# Patient Record
Sex: FEMALE | Race: WHITE | NOT HISPANIC OR LATINO | Employment: FULL TIME | URBAN - METROPOLITAN AREA
[De-identification: names, ages, dates, MRNs, and addresses within clinical notes are randomized per-mention and may not be internally consistent; named-entity substitution may affect disease eponyms.]

---

## 2023-09-08 ENCOUNTER — OFFICE VISIT (OUTPATIENT)
Dept: URGENT CARE | Facility: CLINIC | Age: 38
End: 2023-09-08

## 2023-09-08 ENCOUNTER — HOSPITAL ENCOUNTER (OUTPATIENT)
Facility: MEDICAL CENTER | Age: 38
End: 2023-09-08
Attending: NURSE PRACTITIONER
Payer: COMMERCIAL

## 2023-09-08 VITALS
SYSTOLIC BLOOD PRESSURE: 110 MMHG | HEIGHT: 66 IN | DIASTOLIC BLOOD PRESSURE: 86 MMHG | HEART RATE: 94 BPM | OXYGEN SATURATION: 96 % | TEMPERATURE: 98 F | WEIGHT: 154.32 LBS | RESPIRATION RATE: 16 BRPM | BODY MASS INDEX: 24.8 KG/M2

## 2023-09-08 DIAGNOSIS — Z02.1 PHYSICAL EXAM, PRE-EMPLOYMENT: ICD-10-CM

## 2023-09-08 DIAGNOSIS — Z02.1 PRE-EMPLOYMENT DRUG SCREENING: ICD-10-CM

## 2023-09-08 LAB
AMP AMPHETAMINE: NORMAL
COC COCAINE: NORMAL
INT CON NEG: NORMAL
INT CON POS: NORMAL
MET METHAMPHETAMINES: NORMAL
OPI OPIATES: NORMAL
PCP PHENCYCLIDINE: NORMAL
POC DRUG COMMENT 753798-OCCUPATIONAL HEALTH: NORMAL
THC: NORMAL

## 2023-09-08 PROCEDURE — 3079F DIAST BP 80-89 MM HG: CPT | Performed by: NURSE PRACTITIONER

## 2023-09-08 PROCEDURE — 86480 TB TEST CELL IMMUN MEASURE: CPT | Performed by: NURSE PRACTITIONER

## 2023-09-08 PROCEDURE — 3074F SYST BP LT 130 MM HG: CPT | Performed by: NURSE PRACTITIONER

## 2023-09-08 PROCEDURE — 80305 DRUG TEST PRSMV DIR OPT OBS: CPT | Performed by: NURSE PRACTITIONER

## 2023-09-08 PROCEDURE — 8915 PR COMPREHENSIVE PHYSICAL: Performed by: NURSE PRACTITIONER

## 2023-09-08 RX ORDER — CLONAZEPAM 2 MG/1
1 TABLET ORAL 2 TIMES DAILY
COMMUNITY

## 2023-09-08 RX ORDER — TRAZODONE HYDROCHLORIDE 300 MG/1
300 TABLET ORAL NIGHTLY
COMMUNITY
End: 2023-09-25

## 2023-09-08 RX ORDER — OXCARBAZEPINE 600 MG/1
600 TABLET, FILM COATED ORAL 2 TIMES DAILY
COMMUNITY
End: 2023-09-25

## 2023-09-08 NOTE — PROGRESS NOTES
"Subjective:     Iris Luna is a 37 y.o. female who presents for Employment Physical (Px, TBQ, UDS)       Patient presents for a pre-employment physical exam. Refer to paper documentation scanned into electronic health record under \"Media.\"    During this visit, appropriate PPE was worn and hand hygiene was performed.    PMH:  has no past medical history on file.    MEDS:   Current Outpatient Medications:     clonazepam (KLONOPIN) 2 MG tablet, Take 1 mg by mouth 2 times a day., Disp: , Rfl:     oxcarbazepine (TRILEPTAL) 600 MG tablet, Take 600 mg by mouth 2 times a day., Disp: , Rfl:     trazodone (DESYREL) 300 MG tablet, Take 300 mg by mouth every evening., Disp: , Rfl:     ALLERGIES: No Known Allergies  SURGHX: No past surgical history on file.  SOCHX:       FH: Reviewed with patient, not pertinent to this visit.    ROS reviewed. Refer to paper documentation scanned into electronic health record under \"Media.\"      Objective:     /86   Pulse 94   Temp 36.7 °C (98 °F) (Temporal)   Resp 16   Ht 1.676 m (5' 6\")   Wt 70 kg (154 lb 5.2 oz)   SpO2 96%   BMI 24.91 kg/m²     Physical exam WNL.    Refer to paper documentation scanned into electronic health record under \"Media.\"    Quantiferon pending.      Assessment/Plan:     1. Physical exam, pre-employment  - Quantiferon Gold Plus TB (4 tube); Future    2. Pre-employment drug screening  - POCT 6 Panel Urine Drug Screen    Cleared for duty without restriction or accomodation. Refer to paper documentation scanned into electronic health record under \"Media.\"    "

## 2023-09-10 LAB
GAMMA INTERFERON BACKGROUND BLD IA-ACNC: 0.05 IU/ML
M TB IFN-G BLD-IMP: NEGATIVE
M TB IFN-G CD4+ BCKGRND COR BLD-ACNC: 0.01 IU/ML
MITOGEN IGNF BCKGRD COR BLD-ACNC: >10 IU/ML
QFT TB2 - NIL TBQ2: 0.01 IU/ML

## 2023-09-25 ENCOUNTER — HOSPITAL ENCOUNTER (EMERGENCY)
Facility: MEDICAL CENTER | Age: 38
End: 2023-09-25
Attending: STUDENT IN AN ORGANIZED HEALTH CARE EDUCATION/TRAINING PROGRAM
Payer: COMMERCIAL

## 2023-09-25 ENCOUNTER — OFFICE VISIT (OUTPATIENT)
Dept: URGENT CARE | Facility: PHYSICIAN GROUP | Age: 38
End: 2023-09-25
Payer: COMMERCIAL

## 2023-09-25 ENCOUNTER — APPOINTMENT (OUTPATIENT)
Dept: RADIOLOGY | Facility: MEDICAL CENTER | Age: 38
End: 2023-09-25
Attending: STUDENT IN AN ORGANIZED HEALTH CARE EDUCATION/TRAINING PROGRAM
Payer: COMMERCIAL

## 2023-09-25 VITALS
HEIGHT: 66 IN | DIASTOLIC BLOOD PRESSURE: 92 MMHG | HEART RATE: 113 BPM | TEMPERATURE: 97.9 F | BODY MASS INDEX: 27.97 KG/M2 | SYSTOLIC BLOOD PRESSURE: 122 MMHG | WEIGHT: 174 LBS | RESPIRATION RATE: 14 BRPM | OXYGEN SATURATION: 95 %

## 2023-09-25 VITALS
SYSTOLIC BLOOD PRESSURE: 118 MMHG | HEART RATE: 68 BPM | OXYGEN SATURATION: 92 % | WEIGHT: 172.4 LBS | BODY MASS INDEX: 27.83 KG/M2 | DIASTOLIC BLOOD PRESSURE: 57 MMHG | RESPIRATION RATE: 16 BRPM | TEMPERATURE: 97.8 F

## 2023-09-25 DIAGNOSIS — L03.119 CELLULITIS OF LOWER EXTREMITY, UNSPECIFIED LATERALITY: Primary | ICD-10-CM

## 2023-09-25 DIAGNOSIS — R60.0 BILATERAL LOWER EXTREMITY EDEMA: ICD-10-CM

## 2023-09-25 DIAGNOSIS — R00.0 TACHYCARDIA: ICD-10-CM

## 2023-09-25 LAB
ALBUMIN SERPL BCP-MCNC: 4.1 G/DL (ref 3.2–4.9)
ALBUMIN/GLOB SERPL: 1.8 G/DL
ALP SERPL-CCNC: 84 U/L (ref 30–99)
ALT SERPL-CCNC: 11 U/L (ref 2–50)
ANION GAP SERPL CALC-SCNC: 11 MMOL/L (ref 7–16)
AST SERPL-CCNC: 13 U/L (ref 12–45)
BASOPHILS # BLD AUTO: 0.3 % (ref 0–1.8)
BASOPHILS # BLD: 0.03 K/UL (ref 0–0.12)
BILIRUB SERPL-MCNC: 0.5 MG/DL (ref 0.1–1.5)
BUN SERPL-MCNC: 16 MG/DL (ref 8–22)
CALCIUM ALBUM COR SERPL-MCNC: 8.8 MG/DL (ref 8.5–10.5)
CALCIUM SERPL-MCNC: 8.9 MG/DL (ref 8.5–10.5)
CHLORIDE SERPL-SCNC: 104 MMOL/L (ref 96–112)
CO2 SERPL-SCNC: 26 MMOL/L (ref 20–33)
CREAT SERPL-MCNC: 0.77 MG/DL (ref 0.5–1.4)
D DIMER PPP IA.FEU-MCNC: <0.27 UG/ML (FEU) (ref 0–0.5)
EOSINOPHIL # BLD AUTO: 0.08 K/UL (ref 0–0.51)
EOSINOPHIL NFR BLD: 0.8 % (ref 0–6.9)
ERYTHROCYTE [DISTWIDTH] IN BLOOD BY AUTOMATED COUNT: 54.8 FL (ref 35.9–50)
GFR SERPLBLD CREATININE-BSD FMLA CKD-EPI: 100 ML/MIN/1.73 M 2
GLOBULIN SER CALC-MCNC: 2.3 G/DL (ref 1.9–3.5)
GLUCOSE SERPL-MCNC: 91 MG/DL (ref 65–99)
HCG SERPL QL: NEGATIVE
HCT VFR BLD AUTO: 37.7 % (ref 37–47)
HGB BLD-MCNC: 12.3 G/DL (ref 12–16)
IMM GRANULOCYTES # BLD AUTO: 0.05 K/UL (ref 0–0.11)
IMM GRANULOCYTES NFR BLD AUTO: 0.5 % (ref 0–0.9)
LYMPHOCYTES # BLD AUTO: 2.34 K/UL (ref 1–4.8)
LYMPHOCYTES NFR BLD: 23.3 % (ref 22–41)
MCH RBC QN AUTO: 32.6 PG (ref 27–33)
MCHC RBC AUTO-ENTMCNC: 32.6 G/DL (ref 32.2–35.5)
MCV RBC AUTO: 100 FL (ref 81.4–97.8)
MONOCYTES # BLD AUTO: 0.85 K/UL (ref 0–0.85)
MONOCYTES NFR BLD AUTO: 8.5 % (ref 0–13.4)
NEUTROPHILS # BLD AUTO: 6.69 K/UL (ref 1.82–7.42)
NEUTROPHILS NFR BLD: 66.6 % (ref 44–72)
NRBC # BLD AUTO: 0 K/UL
NRBC BLD-RTO: 0 /100 WBC (ref 0–0.2)
NT-PROBNP SERPL IA-MCNC: 507 PG/ML (ref 0–125)
PLATELET # BLD AUTO: 323 K/UL (ref 164–446)
PMV BLD AUTO: 9.7 FL (ref 9–12.9)
POTASSIUM SERPL-SCNC: 3.4 MMOL/L (ref 3.6–5.5)
PROT SERPL-MCNC: 6.4 G/DL (ref 6–8.2)
RBC # BLD AUTO: 3.77 M/UL (ref 4.2–5.4)
SODIUM SERPL-SCNC: 141 MMOL/L (ref 135–145)
TROPONIN T SERPL-MCNC: <6 NG/L (ref 6–19)
TROPONIN T SERPL-MCNC: <6 NG/L (ref 6–19)
WBC # BLD AUTO: 10 K/UL (ref 4.8–10.8)

## 2023-09-25 PROCEDURE — A9270 NON-COVERED ITEM OR SERVICE: HCPCS | Performed by: STUDENT IN AN ORGANIZED HEALTH CARE EDUCATION/TRAINING PROGRAM

## 2023-09-25 PROCEDURE — 99285 EMERGENCY DEPT VISIT HI MDM: CPT

## 2023-09-25 PROCEDURE — 36415 COLL VENOUS BLD VENIPUNCTURE: CPT

## 2023-09-25 PROCEDURE — 3080F DIAST BP >= 90 MM HG: CPT | Performed by: PHYSICIAN ASSISTANT

## 2023-09-25 PROCEDURE — 83880 ASSAY OF NATRIURETIC PEPTIDE: CPT

## 2023-09-25 PROCEDURE — 93005 ELECTROCARDIOGRAM TRACING: CPT

## 2023-09-25 PROCEDURE — 85025 COMPLETE CBC W/AUTO DIFF WBC: CPT

## 2023-09-25 PROCEDURE — 71045 X-RAY EXAM CHEST 1 VIEW: CPT

## 2023-09-25 PROCEDURE — 700111 HCHG RX REV CODE 636 W/ 250 OVERRIDE (IP): Mod: JZ | Performed by: STUDENT IN AN ORGANIZED HEALTH CARE EDUCATION/TRAINING PROGRAM

## 2023-09-25 PROCEDURE — 84703 CHORIONIC GONADOTROPIN ASSAY: CPT

## 2023-09-25 PROCEDURE — 80053 COMPREHEN METABOLIC PANEL: CPT

## 2023-09-25 PROCEDURE — 85379 FIBRIN DEGRADATION QUANT: CPT

## 2023-09-25 PROCEDURE — 99215 OFFICE O/P EST HI 40 MIN: CPT | Performed by: PHYSICIAN ASSISTANT

## 2023-09-25 PROCEDURE — 3074F SYST BP LT 130 MM HG: CPT | Performed by: PHYSICIAN ASSISTANT

## 2023-09-25 PROCEDURE — 96374 THER/PROPH/DIAG INJ IV PUSH: CPT

## 2023-09-25 PROCEDURE — 700102 HCHG RX REV CODE 250 W/ 637 OVERRIDE(OP): Performed by: STUDENT IN AN ORGANIZED HEALTH CARE EDUCATION/TRAINING PROGRAM

## 2023-09-25 PROCEDURE — 93005 ELECTROCARDIOGRAM TRACING: CPT | Performed by: STUDENT IN AN ORGANIZED HEALTH CARE EDUCATION/TRAINING PROGRAM

## 2023-09-25 PROCEDURE — 84484 ASSAY OF TROPONIN QUANT: CPT | Mod: 91

## 2023-09-25 RX ORDER — IBUPROFEN 600 MG/1
TABLET ORAL
COMMUNITY

## 2023-09-25 RX ORDER — ACETAMINOPHEN 500 MG
1000 TABLET ORAL ONCE
Status: COMPLETED | OUTPATIENT
Start: 2023-09-25 | End: 2023-09-25

## 2023-09-25 RX ORDER — DOXYCYCLINE HYCLATE 100 MG/1
100 CAPSULE ORAL 2 TIMES DAILY
COMMUNITY
Start: 2023-09-14

## 2023-09-25 RX ORDER — OXCARBAZEPINE 600 MG/1
1 TABLET, FILM COATED ORAL 2 TIMES DAILY
COMMUNITY

## 2023-09-25 RX ORDER — CYCLOBENZAPRINE HCL 10 MG
1 TABLET ORAL 3 TIMES DAILY PRN
COMMUNITY

## 2023-09-25 RX ORDER — CLONAZEPAM 1 MG/1
2 TABLET ORAL
COMMUNITY
End: 2023-09-25

## 2023-09-25 RX ORDER — AMOXICILLIN AND CLAVULANATE POTASSIUM 875; 125 MG/1; MG/1
1 TABLET, FILM COATED ORAL 2 TIMES DAILY
COMMUNITY

## 2023-09-25 RX ORDER — NALTREXONE HYDROCHLORIDE 50 MG/1
1 TABLET, FILM COATED ORAL
COMMUNITY

## 2023-09-25 RX ORDER — VALACYCLOVIR HYDROCHLORIDE 1 G/1
TABLET, FILM COATED ORAL
COMMUNITY

## 2023-09-25 RX ORDER — CEFTRIAXONE 1 G/1
1000 INJECTION, POWDER, FOR SOLUTION INTRAMUSCULAR; INTRAVENOUS ONCE
Status: COMPLETED | OUTPATIENT
Start: 2023-09-25 | End: 2023-09-25

## 2023-09-25 RX ORDER — TRAZODONE HYDROCHLORIDE 150 MG/1
TABLET ORAL
COMMUNITY
Start: 2023-09-21

## 2023-09-25 RX ORDER — FLUCONAZOLE 150 MG/1
TABLET ORAL
COMMUNITY

## 2023-09-25 RX ORDER — TRAZODONE HYDROCHLORIDE 150 MG/1
2 TABLET ORAL
COMMUNITY

## 2023-09-25 RX ORDER — SUMATRIPTAN 100 MG/1
TABLET, FILM COATED ORAL
COMMUNITY

## 2023-09-25 RX ADMIN — CEFTRIAXONE SODIUM 1000 MG: 1 INJECTION, POWDER, FOR SOLUTION INTRAMUSCULAR; INTRAVENOUS at 22:08

## 2023-09-25 RX ADMIN — ACETAMINOPHEN 1000 MG: 500 TABLET, FILM COATED ORAL at 21:29

## 2023-09-25 ASSESSMENT — ENCOUNTER SYMPTOMS
COUGH: 1
VOMITING: 0
SPUTUM PRODUCTION: 1
SHORTNESS OF BREATH: 1
FEVER: 0
NAUSEA: 0

## 2023-09-25 NOTE — PROGRESS NOTES
Subjective:   Iris Luna is a 38 y.o. female who presents for Edema (Bilateral )        Patient presents with concerns of bilateral lower extremity edema that has been rapidly worsening over the last 3 days.  She states that her legs are achy and painful.  Prior to the onset of her symptoms she was diagnosed with bronchitis.  She has been on doxycycline for 7 days without significant symptomatic improvement.  She endorses productive cough as well as some exertional shortness of breath.  She denies PND and orthopnea.  Denies recent surgery, exogenous hormone use, history of VTE.  No fevers.  Denies similar symptoms in the past.      Review of Systems   Constitutional:  Negative for fever.   Respiratory:  Positive for cough, sputum production and shortness of breath.    Cardiovascular:  Positive for leg swelling.   Gastrointestinal:  Negative for nausea and vomiting.       PMH:  has no past medical history on file.  MEDS:   Current Outpatient Medications:     cyclobenzaprine (FLEXERIL) 10 mg Tab, Take 1 Tablet by mouth 3 times a day as needed., Disp: , Rfl:     diclofenac DR (VOLTAREN) 50 MG Tablet Delayed Response, TAKE 1 TABLET (50 MG TOTAL) BY MOUTH 2 TIMES A DAY AS NEEDED (BACK PAIN MODERATE TO SEVERE)., Disp: , Rfl:     doxycycline (VIBRAMYCIN) 100 MG Cap, Take 100 mg by mouth 2 times a day., Disp: , Rfl:     ibuprofen (MOTRIN) 600 MG Tab, TAKE 1 TABLET (600 MG TOTAL) BY MOUTH EVERY 6 HOURS AS NEEDED FOR PAIN (PAIN)., Disp: , Rfl:     naltrexone (DEPADE) 50 MG Tab, Take 1 Tablet by mouth every day., Disp: , Rfl:     sumatriptan (IMITREX) 100 MG tablet, TAKE 1 TAB AT ONSET OF HEADACHE. MAY REPEAT IN 2 HOURS. MAX: 2 DOSES IN 24 HOURS., Disp: , Rfl:     valacyclovir (VALTREX) 1 GM Tab, TAKE 1 TABLET BY MOUTH EVERY 12 HOURS AS DIRECTED FOR 7 DAYS, Disp: , Rfl:     oxcarbazepine (TRILEPTAL) 600 MG tablet, Take 1 Tablet by mouth 2 times a day., Disp: , Rfl:     traZODone (DESYREL) 150 MG Tab, Take 2 Tablets by  "mouth at bedtime., Disp: , Rfl:     clonazepam (KLONOPIN) 2 MG tablet, Take 1 mg by mouth 2 times a day., Disp: , Rfl:     amoxicillin-clavulanate (AUGMENTIN) 875-125 MG Tab, Take 1 Tablet by mouth 2 times a day. (Patient not taking: Reported on 9/25/2023), Disp: , Rfl:     fluconazole (DIFLUCAN) 150 MG tablet, TAKE 1 TABLET BY MOUTH EVERY 72 HOURS AS DIRECTED FOR 6 DAYS (Patient not taking: Reported on 9/25/2023), Disp: , Rfl:     traZODone (DESYREL) 150 MG Tab, , Disp: , Rfl:   ALLERGIES:   Allergies   Allergen Reactions    Azithromycin Nausea     Other reaction(s): Abdominal Pain, Not available     SURGHX: No past surgical history on file.  SOCHX:    FH: Family history was reviewed, no pertinent findings to report   Objective:   BP (!) 122/92   Pulse (!) 113   Temp 36.6 °C (97.9 °F) (Temporal)   Resp 14   Ht 1.676 m (5' 6\")   Wt 78.9 kg (174 lb)   SpO2 95%   BMI 28.08 kg/m²   Physical Exam  Vitals reviewed.   Constitutional:       General: She is not in acute distress.     Appearance: Normal appearance. She is well-developed. She is not toxic-appearing.   HENT:      Head: Normocephalic and atraumatic.      Right Ear: External ear normal.      Left Ear: External ear normal.      Nose: Nose normal.   Cardiovascular:      Rate and Rhythm: Regular rhythm. Tachycardia present.      Heart sounds: Normal heart sounds.      Comments: Significant bilateral lower extremity edema, 3+ pitting, bilaterally.  Left leg greater in circumference than right.  Dorsalis pedis and posterior tibial pulses 1+ bilaterally.  Pulmonary:      Effort: Pulmonary effort is normal. No respiratory distress.      Breath sounds: No stridor.      Comments: Rhonchi and rales lower lung fields bilaterally.  Skin:     General: Skin is dry.   Neurological:      Comments: Alert and oriented.    Psychiatric:         Speech: Speech normal.         Behavior: Behavior normal.           Assessment/Plan:   1. Bilateral lower extremity edema    2. " Tachycardia    Other orders  - amoxicillin-clavulanate (AUGMENTIN) 875-125 MG Tab; Take 1 Tablet by mouth 2 times a day. (Patient not taking: Reported on 9/25/2023)  - cyclobenzaprine (FLEXERIL) 10 mg Tab; Take 1 Tablet by mouth 3 times a day as needed.  - diclofenac DR (VOLTAREN) 50 MG Tablet Delayed Response; TAKE 1 TABLET (50 MG TOTAL) BY MOUTH 2 TIMES A DAY AS NEEDED (BACK PAIN MODERATE TO SEVERE).  - doxycycline (VIBRAMYCIN) 100 MG Cap; Take 100 mg by mouth 2 times a day.  - fluconazole (DIFLUCAN) 150 MG tablet; TAKE 1 TABLET BY MOUTH EVERY 72 HOURS AS DIRECTED FOR 6 DAYS (Patient not taking: Reported on 9/25/2023)  - ibuprofen (MOTRIN) 600 MG Tab; TAKE 1 TABLET (600 MG TOTAL) BY MOUTH EVERY 6 HOURS AS NEEDED FOR PAIN (PAIN).  - naltrexone (DEPADE) 50 MG Tab; Take 1 Tablet by mouth every day.  - sumatriptan (IMITREX) 100 MG tablet; TAKE 1 TAB AT ONSET OF HEADACHE. MAY REPEAT IN 2 HOURS. MAX: 2 DOSES IN 24 HOURS.  - valacyclovir (VALTREX) 1 GM Tab; TAKE 1 TABLET BY MOUTH EVERY 12 HOURS AS DIRECTED FOR 7 DAYS  - oxcarbazepine (TRILEPTAL) 600 MG tablet; Take 1 Tablet by mouth 2 times a day.  - traZODone (DESYREL) 150 MG Tab; Take 2 Tablets by mouth at bedtime.  - traZODone (DESYREL) 150 MG Tab;  (Patient not taking: Reported on 9/25/2023)    Differential broad at this point, but presentation concerning.  Recommend immediate reevaluation at a higher level of care.  Patient will go to [facility] ED for reevaluation and further management.

## 2023-09-25 NOTE — LETTER
[facility]  [facility] [address] [address]     September 25, 2023    Patient: Iris Luna   YOB: 1985   Date of Visit: 9/25/2023       To Whom It May Concern:    Iris Luna was seen and treated in our department on 9/25/2023. I recommend that she be reevaluated in the Emergency Department immediately. She will go to [facility] ED for reevaluation.    Sincerely,     Titi Naik P.A.-C.

## 2023-09-26 LAB — EKG IMPRESSION: NORMAL

## 2023-09-26 NOTE — DISCHARGE INSTRUCTIONS
Continue taking the antibiotics as prescribed.  You can try compression stockings while at work, elevating your legs at night.  Make an appoint with a primary care doctor, you may require additional work-up as an outpatient such as ultrasounds of your heart, legs, and follow-up to ensure your symptoms improve.

## 2023-09-26 NOTE — ED PROVIDER NOTES
"ED Provider Note    CHIEF COMPLAINT  Chief Complaint   Patient presents with    Peripheral Edema       EXTERNAL RECORDS REVIEWED  Outpatient Notes the urgent care saw her today, sent her to the emergency department for evaluation of leg swelling    HPI/ROS  LIMITATION TO HISTORY   Select: : None      Iris Luna is a 38 y.o. female who presents with atraumatic leg swelling for several days, she has been recovering from \"bronchitis \"and has had a cough, has been on a course of doxycycline.  He denies a history of leg swelling, she does note the left is more swollen than the right and they are both painful.  She denies a family history of DVT/PE, recent plane flights traumatic injuries surgeries not on OCPs no hemoptysis, does report dyspnea and occasional chest tightness without any chest pain.  Denies a history or heart issues she is a smoker, she drinks 2-3 drinks per night, for the past several weeks.  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    PAST MEDICAL HISTORY       SURGICAL HISTORY  patient denies any surgical history    FAMILY HISTORY  History reviewed. No pertinent family history.    SOCIAL HISTORY  Social History     Tobacco Use    Smoking status: Every Day     Types: Cigarettes    Smokeless tobacco: Not on file   Substance and Sexual Activity    Alcohol use: Yes    Drug use: Not Currently    Sexual activity: Not on file       CURRENT MEDICATIONS  Home Medications       Reviewed by Lina Peterson R.N. (Registered Nurse) on 09/25/23 at 1835  Med List Status: Partial     Medication Last Dose Status   amoxicillin-clavulanate (AUGMENTIN) 875-125 MG Tab  Active   clonazepam (KLONOPIN) 2 MG tablet  Active   cyclobenzaprine (FLEXERIL) 10 mg Tab  Active   diclofenac DR (VOLTAREN) 50 MG Tablet Delayed Response  Active   doxycycline (VIBRAMYCIN) 100 MG Cap  Active   fluconazole (DIFLUCAN) 150 MG tablet  Active   ibuprofen (MOTRIN) 600 MG Tab  Active   naltrexone (DEPADE) 50 MG Tab  Active "   oxcarbazepine (TRILEPTAL) 600 MG tablet  Active   sumatriptan (IMITREX) 100 MG tablet  Active   traZODone (DESYREL) 150 MG Tab  Active   traZODone (DESYREL) 150 MG Tab  Active   valacyclovir (VALTREX) 1 GM Tab  Active                    ALLERGIES  Allergies   Allergen Reactions    Azithromycin Nausea     Other reaction(s): Abdominal Pain, Not available       PHYSICAL EXAM  VITAL SIGNS: /57   Pulse 68   Temp 36.6 °C (97.8 °F) (Temporal)   Resp 16   Wt 78.2 kg (172 lb 6.4 oz)   SpO2 92%   BMI 27.83 kg/m²    General: Pleasant female occasionally coughing, no respiratory distress  Head: Normocephalic atraumatic  Eyes: Extraocular motion intact  Neck: Supple, no rigidity  Cardiovascular: Regular rate and rhythm no murmurs rubs or gallops  Respiratory: Clear to auscultation bilaterally, equal chest rise and fall, no increased work of breathing  Abdomen: Soft nontender no guarding  Musculoskeletal: Warm and well perfused, 3+ peripheral edema.  There is induration and warmth to the anterior shins bilaterally, with a pruritic appearing rash.  2+ DP pulses bilaterally.    Neuro: Alert, no focal deficits  Integumentary: No wounds or rashes      DIAGNOSTIC STUDIES / PROCEDURES  EKG  I have independently interpreted this EKG  Results for orders placed or performed during the hospital encounter of 23   EKG   Result Value Ref Range    Report       [facility] Emergency [facility].    Test Date:  2023  Pt Name:    HENRRY AUGUSTIN             Department: ER  MRN:        [id number]                      Room:  Gender:     Female                       Technician: [id number]  :        1985                   Requested By:ER TRIAGE PROTOCOL  Order #:    [id number]                    Polly MD: Tacos Sands    Measurements  Intervals                                Axis  Rate:       82                           P:          69  GA:         117                          QRS:        54  QRSD:       103                           T:          16  QT:         389  QTc:        455    Interpretive Statements  Normal sinus rhythm, rate of 82, normal axis, no ST elevations or  depressions.  No priors available for comparison.  Electronically Signed On 09- 01:53:46 PDT by Tacos REYES  D-dimer undetectable, troponins negative and undetectable, mild hypokalemia, no anemia, no renal dysfunction, no hepatic dysfunction,      RADIOLOGY  I have independently interpreted the diagnostic imaging associated with this visit and am waiting the final reading from the radiologist.   My preliminary interpretation is as follows: No pulmonary edema  Radiologist interpretation:   DX-CHEST-PORTABLE (1 VIEW)   Final Result      No acute cardiopulmonary abnormality.            COURSE & MEDICAL DECISION MAKING    ED Observation Status? Yes; I am placing the patient in to an observation status due to a diagnostic uncertainty as well as therapeutic intensity. Patient placed in observation status at 8:45 PM, 9/25/2023.     Observation plan is as follows: Labs, D-dimer, serial troponins, EKG, chest x-ray    Upon Reevaluation, the patient's condition has: Improved; and will be discharged.    Patient discharged from ED Observation status at 10:30 PM 9/25/2023    INITIAL ASSESSMENT, COURSE AND PLAN  Care Narrative: 38-year-old female presenting with cough for weeks, 3 days of painful leg swelling bilaterally, no traumatic injuries, no history of DVT, no significant risk factors.  Differential included was not limited to: Heart failure, renal failure, liver failure, DVT/PE, cellulitis, venous stasis        DISPOSITION AND DISCUSSIONS  Patient is stable for discharge home, her work-up is reassuring without evidence of life-threatening causality causing her leg swelling, suspect it may be cellulitis she is already on a course of antibiotics which may be appropriate.  Dependent edema is also suspected, versus chronic venous stasis,  she was referred to PCP, we discussed symptomatic treatment at home, return precautions were given, patient verbalized understanding agreement plan of care was discharged in no acute distress        Escalation of care considered, and ultimately not performed:diagnostic imagingCT pulmonary arteries, ultrasound of the legs    Barriers to care at this time, including but not limited to: Patient does not have established PCP.     Decision tools and prescription drugs considered including, but not limited to:  Wells criteria, low risk .    FINAL DIAGNOSIS  1. Cellulitis of lower extremity, unspecified laterality    2. Bilateral lower extremity edema           Electronically signed by: Tacos aSnds M.D., 9/25/2023 8:14 PM

## 2023-09-28 ENCOUNTER — TELEPHONE (OUTPATIENT)
Dept: HEALTH INFORMATION MANAGEMENT | Facility: OTHER | Age: 38
End: 2023-09-28
Payer: COMMERCIAL

## 2024-01-02 ENCOUNTER — APPOINTMENT (OUTPATIENT)
Dept: URGENT CARE | Facility: PHYSICIAN GROUP | Age: 39
End: 2024-01-02
Payer: OTHER MISCELLANEOUS

## 2024-05-30 ENCOUNTER — OFFICE VISIT (OUTPATIENT)
Dept: BEHAVIORAL HEALTH | Facility: CLINIC | Age: 39
End: 2024-05-30
Payer: COMMERCIAL

## 2024-05-30 VITALS
DIASTOLIC BLOOD PRESSURE: 68 MMHG | OXYGEN SATURATION: 99 % | HEIGHT: 66 IN | BODY MASS INDEX: 27.64 KG/M2 | SYSTOLIC BLOOD PRESSURE: 122 MMHG | HEART RATE: 83 BPM | WEIGHT: 172 LBS

## 2024-05-30 DIAGNOSIS — F41.0 GENERALIZED ANXIETY DISORDER WITH PANIC ATTACKS: ICD-10-CM

## 2024-05-30 DIAGNOSIS — F33.1 MODERATE EPISODE OF RECURRENT MAJOR DEPRESSIVE DISORDER (HCC): ICD-10-CM

## 2024-05-30 DIAGNOSIS — F41.1 GENERALIZED ANXIETY DISORDER WITH PANIC ATTACKS: ICD-10-CM

## 2024-05-30 DIAGNOSIS — F43.10 PTSD (POST-TRAUMATIC STRESS DISORDER): ICD-10-CM

## 2024-05-30 RX ORDER — GABAPENTIN 600 MG/1
600 TABLET ORAL 3 TIMES DAILY
Qty: 90 TABLET | Refills: 0 | Status: SHIPPED | OUTPATIENT
Start: 2024-05-30

## 2024-05-30 RX ORDER — DULOXETIN HYDROCHLORIDE 30 MG/1
30 CAPSULE, DELAYED RELEASE ORAL NIGHTLY
Qty: 30 CAPSULE | Refills: 0 | Status: SHIPPED | OUTPATIENT
Start: 2024-05-30

## 2024-05-30 ASSESSMENT — FIBROSIS 4 INDEX: FIB4 SCORE: 0.49
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
## 2024-06-13 ENCOUNTER — OFFICE VISIT (OUTPATIENT)
Dept: BEHAVIORAL HEALTH | Facility: CLINIC | Age: 39
End: 2024-06-13
Payer: COMMERCIAL

## 2024-06-13 VITALS
HEART RATE: 85 BPM | HEIGHT: 66 IN | SYSTOLIC BLOOD PRESSURE: 124 MMHG | BODY MASS INDEX: 27.64 KG/M2 | OXYGEN SATURATION: 96 % | WEIGHT: 172 LBS | DIASTOLIC BLOOD PRESSURE: 70 MMHG

## 2024-06-13 DIAGNOSIS — F41.0 GENERALIZED ANXIETY DISORDER WITH PANIC ATTACKS: ICD-10-CM

## 2024-06-13 DIAGNOSIS — F43.10 PTSD (POST-TRAUMATIC STRESS DISORDER): ICD-10-CM

## 2024-06-13 DIAGNOSIS — F33.1 MODERATE EPISODE OF RECURRENT MAJOR DEPRESSIVE DISORDER (HCC): ICD-10-CM

## 2024-06-13 DIAGNOSIS — F41.1 GENERALIZED ANXIETY DISORDER WITH PANIC ATTACKS: ICD-10-CM

## 2024-06-13 PROCEDURE — 3074F SYST BP LT 130 MM HG: CPT | Performed by: PSYCHIATRY & NEUROLOGY

## 2024-06-13 PROCEDURE — 3078F DIAST BP <80 MM HG: CPT | Performed by: PSYCHIATRY & NEUROLOGY

## 2024-06-13 PROCEDURE — 99214 OFFICE O/P EST MOD 30 MIN: CPT | Performed by: PSYCHIATRY & NEUROLOGY

## 2024-06-13 PROCEDURE — 90836 PSYTX W PT W E/M 45 MIN: CPT | Performed by: PSYCHIATRY & NEUROLOGY

## 2024-06-13 RX ORDER — CLONAZEPAM 2 MG/1
2 TABLET ORAL 3 TIMES DAILY
Qty: 90 TABLET | Refills: 1 | Status: SHIPPED | OUTPATIENT
Start: 2024-06-13 | End: 2024-08-12

## 2024-06-13 RX ORDER — TRAZODONE HYDROCHLORIDE 150 MG/1
300 TABLET ORAL NIGHTLY
Qty: 30 TABLET | Refills: 3 | Status: SHIPPED | OUTPATIENT
Start: 2024-06-13

## 2024-06-13 RX ORDER — CLONAZEPAM 2 MG/1
2 TABLET ORAL 3 TIMES DAILY
Qty: 90 TABLET | Refills: 1 | Status: SHIPPED | OUTPATIENT
Start: 2024-06-13 | End: 2024-06-13

## 2024-06-13 RX ORDER — TRAZODONE HYDROCHLORIDE 150 MG/1
300 TABLET ORAL NIGHTLY
Qty: 30 TABLET | Refills: 3 | Status: SHIPPED | OUTPATIENT
Start: 2024-06-13 | End: 2024-06-13

## 2024-06-13 RX ORDER — GABAPENTIN 600 MG/1
1200 TABLET ORAL 3 TIMES DAILY
Qty: 90 TABLET | Refills: 0 | Status: SHIPPED | OUTPATIENT
Start: 2024-06-13 | End: 2024-06-13

## 2024-06-13 RX ORDER — OXCARBAZEPINE 300 MG/1
300 TABLET, FILM COATED ORAL NIGHTLY
Qty: 30 TABLET | Refills: 3 | Status: SHIPPED | OUTPATIENT
Start: 2024-06-13

## 2024-06-13 RX ORDER — DULOXETIN HYDROCHLORIDE 60 MG/1
60 CAPSULE, DELAYED RELEASE ORAL DAILY
Qty: 30 CAPSULE | Refills: 1 | Status: SHIPPED | OUTPATIENT
Start: 2024-06-13

## 2024-06-13 RX ORDER — GABAPENTIN 600 MG/1
1200 TABLET ORAL 3 TIMES DAILY
Qty: 90 TABLET | Refills: 0 | Status: SHIPPED | OUTPATIENT
Start: 2024-06-13

## 2024-06-13 RX ORDER — OXCARBAZEPINE 300 MG/1
300 TABLET, FILM COATED ORAL NIGHTLY
Qty: 30 TABLET | Refills: 3 | Status: SHIPPED | OUTPATIENT
Start: 2024-06-13 | End: 2024-06-13

## 2024-06-13 ASSESSMENT — ENCOUNTER SYMPTOMS
NECK PAIN: 1
BACK PAIN: 1
COUGH: 1
NAUSEA: 1
SHORTNESS OF BREATH: 1
VOMITING: 1
SPUTUM PRODUCTION: 1
HEARTBURN: 1
HEMOPTYSIS: 0

## 2024-06-13 ASSESSMENT — FIBROSIS 4 INDEX: FIB4 SCORE: 0.49
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
## 2024-06-13 NOTE — PROGRESS NOTES
"[facility]  PSYCHIATRY FOLLOW-UP NOTE    Identifying Data:  Iris Luna is a  38 y.o. white woman with past medical history of endometriosis, chronic neck and back pain, and past psychiatric history of trauma, ADHD and bipolar disorder, unspecified, referred by her PCP, who presented for intake on 5/30/2024 for depression, anxiety, & bereavement., upon referral by her PCP.  She is currently diagnosed with CHEL, PTSD, ADHD, with an unspecified bipolar disorder (per hx), continued on Klonopin 2 mg PO TID, Trileptal 300 mg PO qhs, & Trazodone 150 mg x2 (300 mg), & at intake was started onto Cymbalta 30 mg PO qhs & Gabapentin 600 mg PO TID.    History Of Present Illness: Feels that she has been doing a little bit better and has been experiencing some \"good days\" which she has not experienced in a long time.  However she is finding that she is waking up with anxiety and waking up at 5:00 in the morning without fail every day.  Goes to bed around 1030 or 11 PM but when she tries to sleep and on the weekend and most days she is unable to sleep last 5 AM.  She ran out of her Trileptal 7 days ago and has been taking Cymbalta at night.  Feels as though she is not getting good quality sleep and is still waking up a few times during the night.  When waking up in the morning she will find herself coughing, then starts feeling sick and sometimes vomits, and gets shaky and feels a heaviness in her chest.  When waking up in the middle of the night does not experience anxiety in this manner.  Has been having more dreams lately, some of them were bothersome, however she also had a dream about her mother and found comfort with this.  2 weeks ago came across a butterfly keychain which felt like a message from her mother because she loved butterflies.  Also saw a male and female bird interact in a way that left her feeling the presence of her parents was with her.    There have been a lot of stressors including " "her father's upcoming memorial service, starting a new job, ongoing stressors with the neighbors and continued accusations by her  that she is being unfaithful, and her 's court case.  Her father's memorial at the [facility] is on the 27th, but since she cannot afford flying back home, she will be able to attend online.  There has been more marital strain, and today expressed \"I do not know if I even have a  when I go home because of how he has been acting.\"  Yesterday took 2 gabapentin at once because she was feeling very stressed about attending a hearing, but found that it was able to help calm her down.  Has noticed that some of her pain has improved and not feeling pinching pain in her shoulders as much and is not experiencing the tingling in her hands that she had been.  Feels it has been helping with about 10 to 20% of her pain but knows that she needs to set an appointment up with her primary care provider to get some imaging done.  Has been able to reduce her kratom usage significantly and her cannabis usage since starting gabapentin.  Denied any side effects from gabapentin, although it does make her eyes feel tired she does not feel that it makes her sleepy or sedated.  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  Had to come in on her break from work in order to make today's appointment.  Still struggling with mood and anxiety, low energy, reduced appetite having not eaten anything at all yesterday and had nothing yet to eat as of 1:30 PM.      Psychiatric Review of Systems:  current symptoms as reported by pt.  Depression: Depressed mood, Difficulty sleeping, Anhedonia, Sense of hopelessness, Low energy, Low appetite, and Difficulty concentrating  Hypomania/Lucretia: Patient has had the following symptoms for over a week:, " "Distractibility, Racing thoughts, and Patient denies any change in mood, increased energy, or marked irritability  Anxiety/Panic Attacks: Denies any anxiety associated symptoms, chest pain, shortness of breath, insomnia, racing thoughts, psychomotor agitation, feelings of losing control, difficulty concentrating  Trauma:  have dreams about her mother who recently passed  Psychosis: Patient reports no signs or symptoms indicative of psychosis  ADHD: fails to give close attention to details or makes careless mistakes in school, has difficulty sustaining attention in tasks or play activities, does not seem to listen when spoken to directly, has difficulty organizing tasks and activities, is easily distracted by extraneous stimuli, is often forgetful in daily activities, fidgets with hands or feet or squirms in seat    Medical Review of Systems:    Review of Systems   Respiratory:  Positive for cough, sputum production and shortness of breath. Negative for hemoptysis.    Gastrointestinal:  Positive for heartburn, nausea and vomiting.   Musculoskeletal:  Positive for back pain and neck pain.       Past Psychiatric History:  Past O/P therapy: Individual therapy - 10 years ago x1 year when she went on probation.    Past O/P psychiatry treatment: [facility] (couple years) when she was living in [place], was able to get her medication recently from a primary care provider here in [place] until she was able to be seen by psychiatrist.  Has been on psychiatric medications on and off over the years since she was a child.  Prior diagnoses: Bipolar disorder, major depressive disorder  Medication trials:   -Trileptal 300 mg p.o. twice daily (however always forgets to take the morning dose and only takes bedtime dose): Uncertain of the benefit  -Klonopin 2 mg PO TID (3 years): helps with anxiety (only med that she's truly found helpful)  -Ritalin: \"it made everything quiet\" and didn't like it around 4-5th grade.  "   -Depakote:  may have been given to her in the past, does not remember  -Lithium: ineffective   -Zoloft: may have been given as a child uncertain of benefit/side effects  -Gabapentin 600 mg: helped with pain   -Xanax 0.5 mg TID: ineffective   -Lexapro: may have been given as a child, uncertain of benefit/side effects  Past psychiatric hospitalizations: 3 years ago - hospitalized 1-2 weeks transferred from an incarceration due to not having access in the halfway to her medications.  When she was 26 yo she reports being hospitalized due to severe anxiety.   Previous suicide attempts:  Denied  History of self-harm: Cut herself once about 3 months ago due to wanting to not feel her psychological pain.    Substance Use History:  Caffeine: 12 oz coffee infrequently, every morning.    Tobacco/Nicotine: Smokes a little under 1 ppd.    ETOH: Has been buying 3 nips  (shooters - vodka) to relax.   Cannabis/CBD: Previously was smoking 1 joint daily.  However since starting gabapentin has been able to stop using due to not feeling the benefit from it while taking gabapentin.    Other substance use: Has been able to reduce her kratom use from 5 pills daily, to only using 2 or 3 times since the last appointment and only taking 3 pills at a time.     Pertinent social History:  Occupation: Recently started working at [employer], with the help of one of her friends who works there.  Was previously working in a memory care unit in [place], had to quit because of having a difficult time returning back to work after the death of her parents and unable to get time off.   Has worked as a caregiver and also as a nanny.   However in general has struggled with maintaining jobs due to her anxiety.    Medications:  Current Outpatient Medications   Medication Sig Dispense Refill    gabapentin (NEURONTIN) 600 MG tablet Take 1 Tablet by mouth 3 times a day. 90 Tablet 0    DULoxetine (CYMBALTA) 30 MG Cap DR Particles Take 1 Capsule by mouth every  "evening. 30 Capsule 0    amoxicillin-clavulanate (AUGMENTIN) 875-125 MG Tab Take 1 Tablet by mouth 2 times a day. (Patient not taking: Reported on 9/25/2023)      cyclobenzaprine (FLEXERIL) 10 mg Tab Take 1 Tablet by mouth 3 times a day as needed.      diclofenac DR (VOLTAREN) 50 MG Tablet Delayed Response TAKE 1 TABLET (50 MG TOTAL) BY MOUTH 2 TIMES A DAY AS NEEDED (BACK PAIN MODERATE TO SEVERE).      doxycycline (VIBRAMYCIN) 100 MG Cap Take 100 mg by mouth 2 times a day.      fluconazole (DIFLUCAN) 150 MG tablet TAKE 1 TABLET BY MOUTH EVERY 72 HOURS AS DIRECTED FOR 6 DAYS (Patient not taking: Reported on 9/25/2023)      ibuprofen (MOTRIN) 600 MG Tab TAKE 1 TABLET (600 MG TOTAL) BY MOUTH EVERY 6 HOURS AS NEEDED FOR PAIN (PAIN).      naltrexone (DEPADE) 50 MG Tab Take 1 Tablet by mouth every day.      sumatriptan (IMITREX) 100 MG tablet TAKE 1 TAB AT ONSET OF HEADACHE. MAY REPEAT IN 2 HOURS. MAX: 2 DOSES IN 24 HOURS.      valacyclovir (VALTREX) 1 GM Tab TAKE 1 TABLET BY MOUTH EVERY 12 HOURS AS DIRECTED FOR 7 DAYS      oxcarbazepine (TRILEPTAL) 600 MG tablet Take 1 Tablet by mouth 2 times a day.      traZODone (DESYREL) 150 MG Tab Take 2 Tablets by mouth at bedtime.      traZODone (DESYREL) 150 MG Tab  (Patient not taking: Reported on 9/25/2023)      clonazepam (KLONOPIN) 2 MG tablet Take 1 mg by mouth 2 times a day.       No current facility-administered medications for this visit.       Mental Status Evaluation:   /70 (BP Location: Right arm, Patient Position: Sitting, BP Cuff Size: Adult)   Pulse 85   Ht 1.676 m (5' 6\")   Wt 78 kg (172 lb)   SpO2 96%   BMI 27.76 kg/m²   Appearance: Well-groomed, with good hygiene.  Dressed with uniform for her new job.  Appeared stated age.  Eye contact was good.  Behavior:  Pleasant and cooperative, with assessment. Appeared appeared somewhat uncomfortable, at times adjusting her positioning in her chair.  Mood: “A little better in some ways.\"  Affect:  Full range, " dysphoric, tearful congruent to stated mood and content.  Motor:  No motor abnormalities, no gait disturbance, or other evidence of EPS.  Was frequently readjusting her sitting position and fidgeting.  Speech: Regular rate, rhythm.    Thought process: Linear. goal-oriented, and logical.  Thought content: was negative for suicidal ideation/homicidal ideation, intent or planning  Perceptions:  Denied AVH, overvalued ideals, no delusional themes noted  Attention span/concentration: Intact  Recent and remote memory:  Not formally tested, however intact based on participation in examination  Fund of knowledge and vocabulary: Average  Judgement:  Good  Insight:  Good   Impulse Control:  Good.     Screenings:       No data to display                   No data to display                   Medical Records/Labs/Diagnostic Tests Reviewed:  [facility] - [facility].    -Klonopin 2 mg #90, 0 RF, disp. 5/24/02024. [facility] - Baylee Barrett M.D. (PCP)      Formulation:  Iris Luna is a  38 y.o. white woman with past medical history of endometriosis, chronic neck and back pain, and past psychiatric history of trauma, ADHD and bipolar disorder, unspecified, referred by her PCP, who presented for intake on 5/30/2024 for depression, anxiety, & bereavement.  She recently relocated to [place] in July 2023 & is establishing care with psychiatry upon referral by a PCP based in [place].  At the time of intake she had been taking Klonopin 2 mg PO TID, Trileptal 300 mg PO qhs, & Trazodone 150 mg x2 (300 mg), as previously prescribed by her psychiatry provider at [facility] in [place], [place].    She did not report symptoms of deb or hypomania, however does struggle with severe anxiety symptoms holding her emotions and, and when she cannot contain them can become reactive.  She meets criteria for MDD, CHEL, and possibly ADHD.  However focus initially is on stabilizing mood and anxiety symptoms by resuming her  longstanding medications and initiated treatment with an SNRI due to the added benefit of helping to address neuropathic pain and migraines.  Additionally was started on gabapentin which she has taken in the past, to help with managing sleep and anxiety in the context of neuropathic pain.  Discussed how Klonopin is not an ideal medication to be on long term, however discussed stabilization of symptoms prior to initiation of a taper.  Will begin taper of Klonopin once anxiety symptoms are better managed.     Improvement in anxiety symptoms and mood with new medications, will further titrate duloxetine and gabapentin for improved symptom management.        DSM 5-TR Diagnoses:  Generalized anxiety disorder, with panic attacks  Post-traumatic stress disorder  Attention-deficit Hyperactivity disorder, predominantly inattentive presentation (diagnosed in childhood)    Pertinent Medical Diagnoses:  Knee pain, back pain  Ovarian cysts  Endometriosis  Migraine headaches        Risk Assessment:  Risk Factors:   Psychiatric History and Current Status: History of mental health diagnosis history of psychiatric hospitalziation; history of trauma; substance-use disorders; family history of psychiatric illness  Psychological Characteristics: None noted  Psychosocial Stressors: Relationship problems; bereavement of mother (2/12/2024) and father (5/7/2024); social isolation following recent move from [place]  Physical Injury or Illness: Chronic pain  Protective Factors:   Psychiatric History and Status: compliance with psychiatric medication; engagement in evidenced-based treatment; motivation and readiness to change; insight  Psychological Characteristics: Problem solving and effective coping strategies; resilience; reasons for living; future orientation  Psychosocial Factors: Support from friends and  and brother although long distance in [place].  Physical Injury or Illness: Medical compliance; able to access  care as needed; support for help seeking  Risk Level:         Suicide: Low       Homicide: Low       Self-Harm: Low       Relapse: Not applicable       Crisis Safety Plan Reviewed Not Indicated    Medication Management:  Increase Cymbalta 60 mg PO qhs, with plans to titrate as tolerated to help address anxiety & mood symptoms in the context of neuropathic pain.  Increase gabapentin 600 mg PO TID for anxiety in the context of neuropathic pain, and to help with minimizing substance usage.  Will have patient start medication initially as 600 mg at bedtime to help with reducing reliance of Klonopin at bedtime, and have her increase as tolerated, taking it initially as an option before trying Klonopin at a reduced half-tablet dosage.    Restart Trileptal 300 mg PO qhs, for mood stabilization.  Has been off medication for the past 7 days because she ran out.  CONTINUE Klonopin 2 mg PO TID, for anxiety for now.  Patient has been on this medication at this dosing for several years in a row, will consider taper in the future once the symptoms have been better stabilized.  CONTINUE Trazodone 150 mg tablet, Take 2 tabs (300 mg) PO qhs, for insomnia.    Additional Plan of Care:  Medical: Patient will follow up as clinically indicated with their PCP.    Therapy: Will provide supportive psychotherapy in subsequent follow up appointments.    Labs: Not clinically indicated at this time    Psychosocial Interventions:  Psychotherapy themes discussed in today’s session: Discussed importance of verbalizing her needs in her marriage, including the impact that her 's threatening of the relationship past on her.  Supportive psychotherapy and processing bereavement apparent.  Preventative Recommendations: discussed proper diet/nutrition, exercise, and sleep hygiene. The patient was advised that any substance use (for treating anxiety, sleep, pain, or mood), impacts efficacy of treatment.  Additionally the patient was encouraged to  abstain or at the very least minimize usage of alcohol, tobacco products, and cannabis, and encouraged highly to avoid the usage of any type of illicit drug, and to use medications only as prescribed, and only for themselves.    Return to clinic in 1 month or sooner if symptoms worsen    The proposed treatment plan was discussed with the patient who was provided the opportunity to ask questions and make suggestions regarding alternative treatment. Patient verbalized understanding and expressed agreement with the plan.     Total time spent on the day of encounter: 60 minutes.   Total time spent in psychotherapy:  38 minutes.    Cynthia Ramos M.D.  06/13/24    This note was created using voice recognition software (Dragon). The accuracy of the dictation is limited by the abilities of the software. I have reviewed the note prior to signing, however some errors in grammar and context are still possible. If you have any questions related to this note please do not hesitate to contact our office.

## 2024-07-16 RX ORDER — GABAPENTIN 600 MG/1
1200 TABLET ORAL 3 TIMES DAILY
Qty: 90 TABLET | Refills: 0 | Status: SHIPPED | OUTPATIENT
Start: 2024-07-16 | End: 2024-07-18 | Stop reason: SDUPTHER

## 2024-07-18 ENCOUNTER — OFFICE VISIT (OUTPATIENT)
Dept: BEHAVIORAL HEALTH | Facility: CLINIC | Age: 39
End: 2024-07-18
Payer: COMMERCIAL

## 2024-07-18 DIAGNOSIS — F33.1 MODERATE EPISODE OF RECURRENT MAJOR DEPRESSIVE DISORDER (HCC): ICD-10-CM

## 2024-07-18 DIAGNOSIS — F41.0 GENERALIZED ANXIETY DISORDER WITH PANIC ATTACKS: ICD-10-CM

## 2024-07-18 DIAGNOSIS — F41.1 GENERALIZED ANXIETY DISORDER WITH PANIC ATTACKS: ICD-10-CM

## 2024-07-18 DIAGNOSIS — F90.0 ADHD (ATTENTION DEFICIT HYPERACTIVITY DISORDER), INATTENTIVE TYPE: ICD-10-CM

## 2024-07-18 DIAGNOSIS — F43.10 PTSD (POST-TRAUMATIC STRESS DISORDER): ICD-10-CM

## 2024-07-18 PROCEDURE — 99214 OFFICE O/P EST MOD 30 MIN: CPT | Performed by: PSYCHIATRY & NEUROLOGY

## 2024-07-18 PROCEDURE — 90838 PSYTX W PT W E/M 60 MIN: CPT | Performed by: PSYCHIATRY & NEUROLOGY

## 2024-07-18 RX ORDER — GABAPENTIN 600 MG/1
1200 TABLET ORAL 3 TIMES DAILY
Qty: 180 TABLET | Refills: 0 | Status: SHIPPED | OUTPATIENT
Start: 2024-07-18 | End: 2024-08-17

## 2024-07-18 RX ORDER — CLONAZEPAM 2 MG/1
2 TABLET ORAL 3 TIMES DAILY
Qty: 90 TABLET | Refills: 0 | Status: SHIPPED | OUTPATIENT
Start: 2024-07-18 | End: 2024-08-17

## 2024-07-18 RX ORDER — LISDEXAMFETAMINE DIMESYLATE 30 MG/1
30 CAPSULE ORAL EVERY MORNING
Qty: 30 CAPSULE | Refills: 0 | Status: SHIPPED | OUTPATIENT
Start: 2024-07-18 | End: 2024-08-17

## 2024-08-13 RX ORDER — TRAZODONE HYDROCHLORIDE 150 MG/1
300 TABLET ORAL NIGHTLY
Qty: 60 TABLET | Refills: 0 | Status: SHIPPED | OUTPATIENT
Start: 2024-08-13 | End: 2024-09-12

## 2024-08-13 NOTE — TELEPHONE ENCOUNTER
Received request via: Patient    Was the patient seen in the last year in this department? Yes    Does the patient have an active prescription (recently filled or refills available) for medication(s) requested? No    [id number]    Does the patient have [facility] Plus and need 100-day supply? (This applies to ALL medications) Patient does not have SCP

## 2024-08-27 DIAGNOSIS — F41.0 GENERALIZED ANXIETY DISORDER WITH PANIC ATTACKS: ICD-10-CM

## 2024-08-27 DIAGNOSIS — F43.10 PTSD (POST-TRAUMATIC STRESS DISORDER): ICD-10-CM

## 2024-08-27 DIAGNOSIS — F41.1 GENERALIZED ANXIETY DISORDER WITH PANIC ATTACKS: ICD-10-CM

## 2024-08-27 DIAGNOSIS — F33.1 MODERATE EPISODE OF RECURRENT MAJOR DEPRESSIVE DISORDER (HCC): ICD-10-CM

## 2024-08-27 NOTE — TELEPHONE ENCOUNTER
Received request via: Patient    Was the patient seen in the last year in this department? Yes    Does the patient have an active prescription (recently filled or refills available) for medication(s) requested? No    [facility]    Does the patient have [facility] Plus and need 100-day supply? (This applies to ALL medications) Patient does not have SCP

## 2024-08-30 ENCOUNTER — APPOINTMENT (OUTPATIENT)
Dept: RADIOLOGY | Facility: IMAGING CENTER | Age: 39
End: 2024-08-30
Attending: NURSE PRACTITIONER
Payer: COMMERCIAL

## 2024-08-30 ENCOUNTER — OFFICE VISIT (OUTPATIENT)
Dept: URGENT CARE | Facility: PHYSICIAN GROUP | Age: 39
End: 2024-08-30
Payer: COMMERCIAL

## 2024-08-30 VITALS
OXYGEN SATURATION: 97 % | DIASTOLIC BLOOD PRESSURE: 82 MMHG | BODY MASS INDEX: 32.3 KG/M2 | HEIGHT: 66 IN | WEIGHT: 201 LBS | TEMPERATURE: 97.8 F | SYSTOLIC BLOOD PRESSURE: 128 MMHG | HEART RATE: 96 BPM | RESPIRATION RATE: 16 BRPM

## 2024-08-30 DIAGNOSIS — S90.122A CONTUSION OF FIFTH TOE OF LEFT FOOT, INITIAL ENCOUNTER: ICD-10-CM

## 2024-08-30 DIAGNOSIS — M79.672 LEFT FOOT PAIN: ICD-10-CM

## 2024-08-30 PROCEDURE — 73630 X-RAY EXAM OF FOOT: CPT | Mod: TC,FY,LT | Performed by: RADIOLOGY

## 2024-08-30 RX ORDER — GABAPENTIN 600 MG/1
1200 TABLET ORAL 3 TIMES DAILY
Qty: 180 TABLET | Refills: 0 | Status: SHIPPED | OUTPATIENT
Start: 2024-08-30 | End: 2024-09-29

## 2024-08-30 RX ORDER — CLONAZEPAM 2 MG/1
2 TABLET ORAL 3 TIMES DAILY
Qty: 90 TABLET | Refills: 0 | Status: SHIPPED | OUTPATIENT
Start: 2024-08-30 | End: 2024-09-29

## 2024-08-30 ASSESSMENT — ENCOUNTER SYMPTOMS
NUMBNESS: 0
FALLS: 1
MYALGIAS: 1
ARTHRALGIAS: 1

## 2024-08-30 ASSESSMENT — FIBROSIS 4 INDEX: FIB4 SCORE: 0.49

## 2024-08-30 NOTE — PROGRESS NOTES
"Subjective:   Iris Luna  is a 38 y.o. female who presents for Foot Problem ((L) foot, happened last night. Pt states she fell after her foot was asleep  )       Foot Problem  This is a new problem. The problem occurs constantly. The problem has been gradually worsening. Associated symptoms include arthralgias and myalgias. Pertinent negatives include no numbness. The symptoms are aggravated by walking and exertion. She has tried NSAIDs and acetaminophen for the symptoms. The treatment provided no relief.     Review of Systems   Musculoskeletal:  Positive for arthralgias, falls and myalgias.   Neurological:  Negative for numbness.     CURRENT MEDICATIONS:  amoxicillin-clavulanate Tabs  clonazepam  cyclobenzaprine Tabs  diclofenac DR Tbec  doxycycline Caps  DULoxetine Cpep  fluconazole  gabapentin  ibuprofen Tabs  naltrexone Tabs  OXcarbazepine Tabs  sumatriptan  traZODone Tabs  valacyclovir Tabs    Allergies:     Allergies   Allergen Reactions    Azithromycin Nausea     Other reaction(s): Abdominal Pain, Not available       Current Problems: Iris Luna does not have a problem list on file.  Past Surgical Hx:  No past surgical history on file.   Past Social Hx:  reports current alcohol use. She reports that she does not currently use drugs.   Past Family Hx:  Iris Luna family history is not on file.    Objective:   /82   Pulse 96   Temp 36.6 °C (97.8 °F) (Temporal)   Resp 16   Ht 1.676 m (5' 6\")   Wt 91.2 kg (201 lb)   SpO2 97%   BMI 32.44 kg/m²   Physical Exam  Musculoskeletal:      Left foot: Normal range of motion and normal capillary refill. Tenderness and bony tenderness present. No deformity, laceration or crepitus. Normal pulse.        Feet:       Comments: Dorsal surface of the left foot with generalized swelling, very tender to palpation.  Pedal pulses intact, flexion and extension are intact with pain.  Most painful over the first second and third metatarsal region.  No " crepitus, wound, or deformity.     Assessment/Plan:   1. Left foot pain  - DX-FOOT-COMPLETE 3+ LEFT    38-year-old female presents with concern for left foot pain and swelling.  Vital signs stable, afebrile.  She is in no acute distress, does have moderate discomfort with examination of the left foot.  Praveen exam demonstrates the dorsal surface of the left foot has generalized swelling, and is very tender to palpation.  Pedal pulses intact, there is no erythema no crepitus no wound or deformity noted.  Pain is primarily over the first second and third metatarsal region.  Imaging ordered, negative for fracture.  Discussed results with patient recommend a walking boot for the next couple weeks recommend OTC NSAIDs, rest, ice, elevation.  Educated that the contusion may take 4 to 6 weeks to fully resolve.     I personally reviewed prior external notes and prior test results pertinent to today's visit.   Discussed management options, risks and benefits, and alternatives to treatment plan agreed upon.   Red flags discussed and indications to immediately call 911 or present to the Emergency Department.   Supportive care, differential diagnoses, and indications for immediate follow-up discussed with patient.    Patient expresses understanding and agrees to plan. Patient denies any other questions or concerns.     Please note that this dictation was created using voice recognition software. I have made every reasonable attempt to correct obvious errors, but I expect that there may be errors of grammar and possibly content that I did not discover before finalizing the note.   This note was electronically signed by STEVEN Monzon